# Patient Record
Sex: FEMALE
[De-identification: names, ages, dates, MRNs, and addresses within clinical notes are randomized per-mention and may not be internally consistent; named-entity substitution may affect disease eponyms.]

---

## 2022-03-15 PROBLEM — Z00.00 ENCOUNTER FOR PREVENTIVE HEALTH EXAMINATION: Status: ACTIVE | Noted: 2022-03-15

## 2022-04-11 ENCOUNTER — APPOINTMENT (OUTPATIENT)
Dept: UROLOGY | Facility: CLINIC | Age: 67
End: 2022-04-11
Payer: COMMERCIAL

## 2022-04-11 VITALS
SYSTOLIC BLOOD PRESSURE: 151 MMHG | DIASTOLIC BLOOD PRESSURE: 90 MMHG | BODY MASS INDEX: 19.63 KG/M2 | HEART RATE: 72 BPM | OXYGEN SATURATION: 100 % | HEIGHT: 64 IN | WEIGHT: 115 LBS | TEMPERATURE: 97.8 F

## 2022-04-11 DIAGNOSIS — R30.0 DYSURIA: ICD-10-CM

## 2022-04-11 LAB
BILIRUB UR QL STRIP: NORMAL
CLARITY UR: CLEAR
COLLECTION METHOD: NORMAL
GLUCOSE UR-MCNC: NORMAL
HCG UR QL: 0.2 EU/DL
HGB UR QL STRIP.AUTO: NORMAL
KETONES UR-MCNC: NORMAL
LEUKOCYTE ESTERASE UR QL STRIP: NORMAL
NITRITE UR QL STRIP: NORMAL
PH UR STRIP: 7
PROT UR STRIP-MCNC: NORMAL
SP GR UR STRIP: 1.01

## 2022-04-11 PROCEDURE — 81003 URINALYSIS AUTO W/O SCOPE: CPT | Mod: QW

## 2022-04-11 PROCEDURE — 99203 OFFICE O/P NEW LOW 30 MIN: CPT

## 2022-04-12 LAB
APPEARANCE: CLEAR
BACTERIA: NEGATIVE
BILIRUBIN URINE: NEGATIVE
BLOOD URINE: NEGATIVE
COLOR: NORMAL
GLUCOSE QUALITATIVE U: NEGATIVE
HYALINE CASTS: 0 /LPF
KETONES URINE: NEGATIVE
LEUKOCYTE ESTERASE URINE: NEGATIVE
MICROSCOPIC-UA: NORMAL
NITRITE URINE: NEGATIVE
PH URINE: 7.5
PROTEIN URINE: NEGATIVE
RED BLOOD CELLS URINE: 0 /HPF
SPECIFIC GRAVITY URINE: 1.01
SQUAMOUS EPITHELIAL CELLS: 0 /HPF
UROBILINOGEN URINE: NORMAL
WHITE BLOOD CELLS URINE: 0 /HPF

## 2022-04-12 NOTE — ASSESSMENT
[FreeTextEntry1] : 66 year old with abnormal bladder US. \par \par 1. urine sent for UA, culture and cytology\par 2. Pt to have renal/bladder US sent for my review\par \par Pt to schedule a follow-up appt to discuss bladder US results.

## 2022-04-12 NOTE — HISTORY OF PRESENT ILLNESS
[FreeTextEntry1] : 66 year old  with recent bladder imaging done electively for "surveillance".  She does not bring records with her but states there were "bumps" on the bladder.  Pt has no symptoms.  No recent UTI's.  Denies hematuria, no recent fever/chills. Nocturia x1. \par \par \par PMH:  mild VonWillebrands\par PSH: , ectopic pregnancy, breast fibroids\par FH: no stones, no  malignacny\par SH: 3-4 glasses wine/week, no tobacco\par \par caffeine- 2 cups/day, 1 can diet soda/day

## 2022-04-13 LAB — BACTERIA UR CULT: NORMAL

## 2022-04-14 LAB — URINE CYTOLOGY: NORMAL

## 2022-04-26 ENCOUNTER — APPOINTMENT (OUTPATIENT)
Dept: UROLOGY | Facility: CLINIC | Age: 67
End: 2022-04-26
Payer: COMMERCIAL

## 2022-04-26 VITALS
HEART RATE: 70 BPM | TEMPERATURE: 96.7 F | SYSTOLIC BLOOD PRESSURE: 154 MMHG | OXYGEN SATURATION: 97 % | DIASTOLIC BLOOD PRESSURE: 91 MMHG

## 2022-04-26 PROCEDURE — 99213 OFFICE O/P EST LOW 20 MIN: CPT

## 2022-05-23 NOTE — LETTER BODY
[Dear  ___] : Dear  [unfilled], [Consult Letter:] : I had the pleasure of evaluating your patient, [unfilled]. [Please see my note below.] : Please see my note below. [Consult Closing:] : Thank you very much for allowing me to participate in the care of this patient.  If you have any questions, please do not hesitate to contact me. [Sincerely,] : Sincerely, [FreeTextEntry3] : Dr. Radha Hurley

## 2022-05-23 NOTE — ASSESSMENT
[FreeTextEntry1] : 66 year old with incidental finding of small bladder diverticulum on pelvic US.  She has no urological complaints and \par a normal UA.   No further work-up is warranted. She will f/u with me as needed. \par \par Patient expressed understanding.

## 2022-05-23 NOTE — HISTORY OF PRESENT ILLNESS
[FreeTextEntry1] : 66 year old  with recent bladder imaging done electively for "surveillance".  She presents today with ultrasound report to discuss findings further.   She denies hematuria, no recent fever/chills. Nocturia x1. \par \par pelvic US: 11/3/21-- demonstrates small a diverticula, otherwise unremarkable \par \par \par PMH:  mild VonWillebrands\par PSH: , ectopic pregnancy, breast fibroids\par FH: no stones, no  malignancy\par SH: 3-4 glasses wine/week, no tobacco\par \par caffeine- 2 cups/day, 1 can diet soda/day

## 2022-05-23 NOTE — ADDENDUM
[FreeTextEntry1] : A portion of this note was written by [Kassandra Campos] on 04/26/2022 acting as a scribe for Dr. Hurley. \par \par I have personally reviewed the chart and agree that the record accurately reflects my personal performance of the history, physical exam, assessment, and plan.